# Patient Record
Sex: FEMALE | Race: WHITE | NOT HISPANIC OR LATINO | Employment: UNEMPLOYED | URBAN - NONMETROPOLITAN AREA
[De-identification: names, ages, dates, MRNs, and addresses within clinical notes are randomized per-mention and may not be internally consistent; named-entity substitution may affect disease eponyms.]

---

## 2017-11-21 ENCOUNTER — TRANSCRIBE ORDERS (OUTPATIENT)
Dept: ADMINISTRATIVE | Facility: HOSPITAL | Age: 37
End: 2017-11-21

## 2017-11-21 DIAGNOSIS — Z79.3 LONG TERM CURRENT USE OF HORMONAL CONTRACEPTIVE: Primary | ICD-10-CM

## 2017-12-11 ENCOUNTER — HOSPITAL ENCOUNTER (OUTPATIENT)
Dept: BONE DENSITY | Facility: HOSPITAL | Age: 37
Discharge: HOME | End: 2017-12-11
Admitting: NURSE PRACTITIONER

## 2017-12-11 DIAGNOSIS — Z79.3 LONG TERM CURRENT USE OF HORMONAL CONTRACEPTIVE: ICD-10-CM

## 2017-12-11 PROCEDURE — 77080 DXA BONE DENSITY AXIAL: CPT | Performed by: RADIOLOGY

## 2017-12-11 PROCEDURE — 77080 DXA BONE DENSITY AXIAL: CPT

## 2021-11-17 ENCOUNTER — OFFICE VISIT (OUTPATIENT)
Dept: SURGERY | Facility: CLINIC | Age: 41
End: 2021-11-17

## 2021-11-17 VITALS
SYSTOLIC BLOOD PRESSURE: 110 MMHG | WEIGHT: 231 LBS | BODY MASS INDEX: 35.01 KG/M2 | HEART RATE: 56 BPM | DIASTOLIC BLOOD PRESSURE: 70 MMHG | HEIGHT: 68 IN

## 2021-11-17 DIAGNOSIS — L72.3 SEBACEOUS CYST: Primary | ICD-10-CM

## 2021-11-17 PROCEDURE — 11403 EXC TR-EXT B9+MARG 2.1-3CM: CPT | Performed by: SURGERY

## 2021-11-17 RX ORDER — MELOXICAM 15 MG/1
15 TABLET ORAL DAILY
COMMUNITY

## 2021-11-17 RX ORDER — ATORVASTATIN CALCIUM 10 MG/1
10 TABLET, FILM COATED ORAL DAILY
COMMUNITY

## 2021-11-17 RX ORDER — HYDROXYZINE HYDROCHLORIDE 25 MG/1
25 TABLET, FILM COATED ORAL 3 TIMES DAILY PRN
COMMUNITY

## 2021-11-17 RX ORDER — MEDROXYPROGESTERONE ACETATE 150 MG/ML
150 INJECTION, SUSPENSION INTRAMUSCULAR
COMMUNITY

## 2021-11-17 RX ORDER — LISINOPRIL 10 MG/1
10 TABLET ORAL DAILY
COMMUNITY

## 2021-11-17 NOTE — PROGRESS NOTES
"Emre Haynes is a 40 y.o. female is being seen for consultation today at the request of MICKEY Dyson    40 y.o. female presenting for evaluation of skin lesion. Lesion on Suprapubic region with patient's observations stated as increasing diameter, Recent infection, exam of this area shows sebaceous cyst, features Dermal layer swelling with central abdominal pore consistent with sebaceous cyst, size 2.1 mm.    History reviewed. No pertinent past medical history.    History reviewed. No pertinent family history.    Social History     Socioeconomic History   • Marital status:    Tobacco Use   • Smoking status: Current Every Day Smoker     Packs/day: 0.50     Types: Cigarettes   • Smokeless tobacco: Never Used   Vaping Use   • Vaping Use: Never used   Substance and Sexual Activity   • Alcohol use: Never   • Drug use: Never       History reviewed. No pertinent surgical history.    Review of Systems   Constitutional: Negative for activity change, appetite change, chills and fever.   HENT: Negative for sore throat and trouble swallowing.    Eyes: Negative for visual disturbance.   Respiratory: Negative for cough and shortness of breath.    Cardiovascular: Negative for chest pain and palpitations.   Gastrointestinal: Negative for abdominal distention, abdominal pain, blood in stool, constipation, diarrhea, nausea and vomiting.   Endocrine: Negative for cold intolerance and heat intolerance.   Genitourinary: Negative for dysuria.   Musculoskeletal: Negative for joint swelling.   Skin: Negative for color change, rash and wound.   Allergic/Immunologic: Negative for immunocompromised state.   Neurological: Negative for dizziness, seizures, weakness and headaches.   Hematological: Negative for adenopathy. Does not bruise/bleed easily.   Psychiatric/Behavioral: Negative for agitation and confusion.         /70   Pulse 56   Ht 172.7 cm (68\")   Wt 105 kg (231 lb)   BMI 35.12 kg/m² "   Objective   Physical Exam  Constitutional:       Appearance: She is well-developed.   HENT:      Head: Normocephalic and atraumatic.   Eyes:      Conjunctiva/sclera: Conjunctivae normal.      Pupils: Pupils are equal, round, and reactive to light.   Neck:      Thyroid: No thyromegaly.      Vascular: No JVD.      Trachea: No tracheal deviation.   Cardiovascular:      Rate and Rhythm: Normal rate and regular rhythm.      Heart sounds: No murmur heard.  No friction rub. No gallop.    Pulmonary:      Effort: Pulmonary effort is normal.      Breath sounds: Normal breath sounds.   Abdominal:      General: There is no distension.      Palpations: Abdomen is soft. There is no hepatomegaly or splenomegaly.      Tenderness: There is no abdominal tenderness.      Hernia: No hernia is present.   Musculoskeletal:         General: No deformity. Normal range of motion.      Cervical back: Neck supple.   Skin:     General: Skin is warm and dry.      Comments: Suprapubic 2.1 cm sebaceous cyst   Neurological:      Mental Status: She is alert and oriented to person, place, and time.       Excision of 2.1 cm suprapubic sebaceous cyst due to recent infection and increasing size and pain    The suprapubic region was prepped and draped in sterile fashion.  Timeout procedure was performed.  After injection of local anesthetic an elliptical incision around the dominant sinus was made and the cyst and surrounding tissue were excised and sent to pathology.  The incision was closed with interrupted nylon sutures and patient tolerated the procedure well.  A bandage was applied.    Estimated blood loss: Less than 5 mL    Specimens: Sebaceous cyst    Complications: None    Diagnoses and all orders for this visit:    1. Sebaceous cyst (Primary)        Assessment     Irish Haynes is a 40 y.o. female with recently inflamed sebaceous cyst of the suprapubic region excised in the office today.  Follow-up in 2 weeks for suture removal.  Patient's  Body mass index is 35.12 kg/m². indicating that she is morbidly obese (BMI > 40 or > 35 with obesity - related health condition). Obesity-related health conditions include the following: hypertension and diabetes mellitus. Obesity is unchanged. BMI is is above average; BMI management plan is completed. We discussed portion control and increasing exercise..

## 2021-12-01 ENCOUNTER — OFFICE VISIT (OUTPATIENT)
Dept: SURGERY | Facility: CLINIC | Age: 41
End: 2021-12-01

## 2021-12-01 VITALS — WEIGHT: 231 LBS | HEIGHT: 68 IN | BODY MASS INDEX: 35.01 KG/M2

## 2021-12-01 DIAGNOSIS — L72.3 SEBACEOUS CYST: Primary | ICD-10-CM

## 2021-12-01 PROCEDURE — 99212 OFFICE O/P EST SF 10 MIN: CPT | Performed by: SURGERY

## 2021-12-02 NOTE — PROGRESS NOTES
Subjective   Irish Haynes is a 40 y.o. female  is here today for follow-up.         Irish Haynes is a 40 y.o. female here for follow up after sebaceous cyst excision from the suprapubic location.  The patient is doing well and sutures have been removed in the office today.  She has healed without complication or recurrence.        Assessment     Diagnoses and all orders for this visit:    1. Sebaceous cyst (Primary)      Irish Haynes is a 40 y.o. female doing well after sebaceous cyst excision.  Patient sutures removed in the office today.  Follow-up as needed.